# Patient Record
Sex: MALE | Race: WHITE | Employment: FULL TIME | URBAN - NONMETROPOLITAN AREA
[De-identification: names, ages, dates, MRNs, and addresses within clinical notes are randomized per-mention and may not be internally consistent; named-entity substitution may affect disease eponyms.]

---

## 2021-04-15 ENCOUNTER — OFFICE VISIT (OUTPATIENT)
Dept: PRIMARY CARE CLINIC | Age: 36
End: 2021-04-15
Payer: COMMERCIAL

## 2021-04-15 VITALS
TEMPERATURE: 99.9 F | RESPIRATION RATE: 16 BRPM | DIASTOLIC BLOOD PRESSURE: 100 MMHG | SYSTOLIC BLOOD PRESSURE: 168 MMHG | HEIGHT: 71 IN | BODY MASS INDEX: 36.18 KG/M2 | WEIGHT: 258.4 LBS | HEART RATE: 84 BPM

## 2021-04-15 DIAGNOSIS — I10 ESSENTIAL HYPERTENSION: ICD-10-CM

## 2021-04-15 DIAGNOSIS — J03.00 ACUTE NON-RECURRENT STREPTOCOCCAL TONSILLITIS: Primary | ICD-10-CM

## 2021-04-15 PROCEDURE — 99213 OFFICE O/P EST LOW 20 MIN: CPT | Performed by: FAMILY MEDICINE

## 2021-04-15 RX ORDER — CLINDAMYCIN HYDROCHLORIDE 300 MG/1
300 CAPSULE ORAL 3 TIMES DAILY
Qty: 30 CAPSULE | Refills: 0 | Status: SHIPPED | OUTPATIENT
Start: 2021-04-15 | End: 2021-04-25

## 2021-04-15 RX ORDER — CLOBETASOL PROPIONATE 0.5 MG/G
CREAM TOPICAL
COMMUNITY
Start: 2021-04-02

## 2021-04-15 RX ORDER — LOSARTAN POTASSIUM AND HYDROCHLOROTHIAZIDE 12.5; 1 MG/1; MG/1
1 TABLET ORAL DAILY
Qty: 30 TABLET | Refills: 0 | Status: SHIPPED | OUTPATIENT
Start: 2021-04-15 | End: 2021-05-14 | Stop reason: SDUPTHER

## 2021-04-15 ASSESSMENT — PATIENT HEALTH QUESTIONNAIRE - PHQ9
SUM OF ALL RESPONSES TO PHQ9 QUESTIONS 1 & 2: 0
2. FEELING DOWN, DEPRESSED OR HOPELESS: 0
SUM OF ALL RESPONSES TO PHQ QUESTIONS 1-9: 0
1. LITTLE INTEREST OR PLEASURE IN DOING THINGS: 0

## 2021-04-15 NOTE — PROGRESS NOTES
Patient is here with complaints of sore throat. He said that this began on Tuesday. He said after he came in from mowing his lawn he noticed it. He said he did run a fever yesterday of 100.4. Today in office his temperature was 99.9. He said he has taken allergy pills, iburprofen, and dayquil and nyquil which he said does give him a little relief. Patient did have a blood pressure in office today of 168/100. He said he will start a medication to lower it because this has been an ongoing issue. Allergies:  Ceclor [cefaclor]    Past Medical History:    Past Medical History:   Diagnosis Date    Hypertension        Past Surgical History:    Past Surgical History:   Procedure Laterality Date    KNEE ARTHROSCOPY Bilateral 2003; 2007   C/ [facility] [address]    TOE SURGERY Left 2000       Social History:   Social History     Tobacco Use    Smoking status: Never Smoker    Smokeless tobacco: Never Used   Substance Use Topics    Alcohol use: Not on file       Family History:   No family history on file. Review of Systems:  Constitutional: positive for fever or chills  Eyes: negative for visual disturbance   ENT: positive for sore throat ,no nasal congestion  Respiratory: negative for cough, shortness of breath and sputum  Cardiovascular: negative for chest pain or palpitations  Genitourinary: negative for dysuria,hematuria, urgency or frequency  Musculoskeletal:negative for arthralgias, muscle weakness and stiff joints   Integument/breast: negative for skin rash or lesions      Objective:  Physical Exam:  GEN:   A & O x3, no apparent distress  EYES: No gross abnormalities. ENT:neck without nodes and tonsils red, enlarged, with exudate present  NECK: normal, supple, no lymphadenopathy,  no carotid bruits  PULM: clear to auscultation bilaterally- no wheezes, rales or rhonchi, normal air movement, no respiratory distress  COR: regular rate & rhythm, no murmurs and no gallops  Skin: No rash    Assessment:  1. Acute non-recurrent streptococcal tonsillitis    2. Essential hypertension          Plan:  · Encouraged increased oral fluids  Orders Placed This Encounter   Procedures    CBC Auto Differential     Standing Status:   Future     Standing Expiration Date:   4/15/2022    Comprehensive Metabolic Panel     Standing Status:   Future     Standing Expiration Date:   4/15/2022    Lipid Panel     Standing Status:   Future     Standing Expiration Date:   4/15/2022     Order Specific Question:   Is Patient Fasting?/# of Hours     Answer:   yes     No follow-ups on file.    Orders Placed This Encounter   Medications    losartan-hydroCHLOROthiazide (HYZAAR) 100-12.5 MG per tablet     Sig: Take 1 tablet by mouth daily     Dispense:  30 tablet     Refill:  0    clindamycin (CLEOCIN) 300 MG capsule     Sig: Take 1 capsule by mouth 3 times daily for 10 days     Dispense:  30 capsule     Refill:  0     Medication directions and side effects discussed    Electronically signed by Chalo Robertson MD on 4/15/2021 at 10:23 AM

## 2021-05-12 LAB
ABSOLUTE BASO #: 0 X10E9/L (ref 0–0.2)
ABSOLUTE EOS #: 0.1 X10E9/L (ref 0–0.4)
ABSOLUTE LYMPH #: 1.7 X10E9/L (ref 1–3.5)
ABSOLUTE MONO #: 0.5 X10E9/L (ref 0–0.9)
ABSOLUTE NEUT #: 2.5 X10E9/L (ref 1.5–6.6)
ALBUMIN SERPL-MCNC: 4.3 G/DL (ref 3.2–5.3)
ALK PHOSPHATASE: 33 U/L (ref 39–130)
ALT SERPL-CCNC: 22 U/L (ref 0–40)
ANION GAP SERPL CALCULATED.3IONS-SCNC: 8 MMOL/L (ref 5–15)
AST SERPL-CCNC: 15 U/L (ref 0–41)
BASOPHILS RELATIVE PERCENT: 0.8 %
BILIRUB SERPL-MCNC: 0.5 MG/DL (ref 0.3–1.2)
BUN BLDV-MCNC: 17 MG/DL (ref 5–23)
CALCIUM SERPL-MCNC: 9.2 MG/DL (ref 8.5–10.5)
CHLORIDE BLD-SCNC: 102 MMOL/L (ref 98–109)
CHOLESTEROL/HDL RATIO: 4.5 (ref 1–5)
CHOLESTEROL: 234 MG/DL (ref 150–200)
CO2: 27 MMOL/L (ref 22–32)
CREAT SERPL-MCNC: 0.84 MG/DL (ref 0.6–1.3)
EGFR AFRICAN AMERICAN: >60 ML/MIN/1.73SQ.M
EGFR IF NONAFRICAN AMERICAN: >60 ML/MIN/1.73SQ.M
EOSINOPHILS RELATIVE PERCENT: 2 %
GLUCOSE: 96 MG/DL (ref 65–99)
HCT VFR BLD CALC: 43.6 % (ref 39–49)
HDLC SERPL-MCNC: 52 MG/DL
HEMOGLOBIN: 15.2 G/DL (ref 13–17)
LDL CHOLESTEROL CALCULATED: 156 MG/DL
LDL/HDL RATIO: 3
LYMPHOCYTE %: 35.2 %
MCH RBC QN AUTO: 29.6 PG (ref 27–34)
MCHC RBC AUTO-ENTMCNC: 35 G/DL (ref 32–36)
MCV RBC AUTO: 85 FL (ref 80–100)
MONOCYTES # BLD: 10.8 %
NEUTROPHILS RELATIVE PERCENT: 51.2 %
PDW BLD-RTO: 13.9 % (ref 11.5–15)
PLATELETS: 244 X10E9/L (ref 150–450)
PMV BLD AUTO: 8.3 FL (ref 7–12)
POTASSIUM SERPL-SCNC: 4.1 MMOL/L (ref 3.5–5)
RBC: 5.15 X10E12/L (ref 4.1–5.7)
SODIUM BLD-SCNC: 137 MMOL/L (ref 134–146)
TOTAL PROTEIN: 7.3 G/DL (ref 6–8)
TRIGL SERPL-MCNC: 130 MG/DL (ref 27–150)
VLDLC SERPL CALC-MCNC: 26 MG/DL (ref 0–30)
WBC: 4.9 X10E9/L (ref 4–11)

## 2021-05-14 ENCOUNTER — OFFICE VISIT (OUTPATIENT)
Dept: PRIMARY CARE CLINIC | Age: 36
End: 2021-05-14
Payer: COMMERCIAL

## 2021-05-14 VITALS
HEART RATE: 72 BPM | DIASTOLIC BLOOD PRESSURE: 88 MMHG | WEIGHT: 251.6 LBS | BODY MASS INDEX: 35.09 KG/M2 | RESPIRATION RATE: 16 BRPM | SYSTOLIC BLOOD PRESSURE: 136 MMHG

## 2021-05-14 DIAGNOSIS — E78.00 HYPERCHOLESTEROLEMIA: ICD-10-CM

## 2021-05-14 DIAGNOSIS — I10 ESSENTIAL HYPERTENSION: Primary | ICD-10-CM

## 2021-05-14 PROCEDURE — 99213 OFFICE O/P EST LOW 20 MIN: CPT | Performed by: FAMILY MEDICINE

## 2021-05-14 RX ORDER — LOSARTAN POTASSIUM AND HYDROCHLOROTHIAZIDE 12.5; 1 MG/1; MG/1
1 TABLET ORAL DAILY
Qty: 90 TABLET | Refills: 0 | Status: SHIPPED | OUTPATIENT
Start: 2021-05-14 | End: 2021-08-20 | Stop reason: SDUPTHER

## 2021-05-14 RX ORDER — ATORVASTATIN CALCIUM 20 MG/1
20 TABLET, FILM COATED ORAL DAILY
Qty: 30 TABLET | Refills: 3 | Status: SHIPPED | OUTPATIENT
Start: 2021-05-14 | End: 2021-08-20 | Stop reason: SDUPTHER

## 2021-05-14 SDOH — ECONOMIC STABILITY: FOOD INSECURITY: WITHIN THE PAST 12 MONTHS, YOU WORRIED THAT YOUR FOOD WOULD RUN OUT BEFORE YOU GOT MONEY TO BUY MORE.: NEVER TRUE

## 2021-05-14 SDOH — ECONOMIC STABILITY: INCOME INSECURITY: HOW HARD IS IT FOR YOU TO PAY FOR THE VERY BASICS LIKE FOOD, HOUSING, MEDICAL CARE, AND HEATING?: NOT HARD AT ALL

## 2021-05-14 NOTE — PROGRESS NOTES
Hypertension: Patient here for follow-up of elevated blood pressure. He is not exercising and is adherent to low salt diet. Blood pressure is not well controlled at home. Patient does not take blood pressure at home. Cardiac symptoms none. Patient denies chest pain, fatigue and palpitations. Cardiovascular risk factors: hypertension, male gender and obesity (BMI >= 30 kg/m2). Use of agents associated with hypertension: none. Patient began Losartan at his last visit, he has had no side effects with the medication. In office his blood pressure was 138/92. Patient is also here to receive lab results. Allergies:  Ceclor [cefaclor]    Past Medical History:    Past Medical History:   Diagnosis Date    Hypertension        Past Surgical History:    Past Surgical History:   Procedure Laterality Date    [facility]    TOE SURGERY Left 2000       Social History:   Social History     Tobacco Use    Smoking status: Never Smoker    Smokeless tobacco: Never Used   Substance Use Topics    Alcohol use: Not on file       Family History:   No family history on file. Review of Systems:  Constitutional: negative for fevers or chills  Eyes: negative for visual disturbance   ENT: negative for sore throat or nasal congestion  Respiratory: no cough or shortness of breath  Cardiovascular: negative for chest pain or palpitations  Gastrointestinal: negative for abd pain, nausea, vomiting, diarrhea or constipation  Genitourinary: negative for dysuria, urgency or frequency  Integument/breast: negative for skin rash or lesions  Neurological: negative for unilateral weakness, numbness or tingling. Skeletal Muscular: no joint pain     Medication List          Accurate as of May 14, 2021  9:30 AM. If you have any questions, ask your nurse or doctor.             START taking these medications    atorvastatin 20 MG tablet  Commonly known as: Lipitor  Take 1 tablet by mouth daily  Started by: Aissatou Dean MD        CONTINUE taking these medications    clobetasol 0.05 % cream  Commonly known as: TEMOVATE     losartan-hydroCHLOROthiazide 100-12.5 MG per tablet  Commonly known as: HYZAAR  Take 1 tablet by mouth daily           Where to Get Your Medications      These medications were sent to [address] [place] [address], [address] [place] [address] RAJESH Gallardo    Phone: [phone]   · atorvastatin 20 MG tablet  · losartan-hydroCHLOROthiazide 100-12.5 MG per tablet         Objective:  Physical Exam:  VitalsPulse 72   Resp 16   Wt 251 lb 9.6 oz (114.1 kg)   BMI 35.09 kg/m²   GEN:   A & O x3, no apparent distress  EYES: No gross abnormalities. ENT:ENT exam normal, no neck nodes or sinus tenderness  NECK: normal, supple, no lymphadenopathy,  no carotid bruits  PULM: clear to auscultation bilaterally- no wheezes, rales or rhonchi, normal air movement, no respiratory distress  COR: regular rate & rhythm, no murmurs and no gallops  ABD:  soft, non-tender, non-distended, normal bowel sounds, no masses or organomegaly  : deferred  EXT: normal strength, tone, and muscle mass  NEURO: Motor and sensory grossly intact  SKIN:  No skin lesions or rashes      Labs:  Lab Results   Component Value Date    BUN 17 05/12/2021    CREATININE 0.84 05/12/2021     05/12/2021    K 4.1 05/12/2021    CALCIUM 9.2 05/12/2021     05/12/2021    CO2 27 05/12/2021    CHOL 234 (H) 05/12/2021    HDL 52 05/12/2021    TRIG 130 05/12/2021    ALT 22 05/12/2021    AST 15 05/12/2021       Assessment:  1. Essential hypertension    2. Hypercholesterolemia      Patient Active Problem List   Diagnosis Code    Essential hypertension I10     Labs reviewed : cbc,cmp,lipid  Other tests reviewed:   Plan:   Diagnosis Orders   1. Essential hypertension     2.  Hypercholesterolemia         · Continue current medications  · Add lipitor  · Encouraged low sodium, low cholesterol, weight loss diet. · Goal for blood pressure controll is 120/80  · Recommended regular exercise as tolerated, 3-5 times per day  · Labs: fasting lipids, ALT, AST and BMP in 1 months  · Follow up in 3 months  ·     No orders of the defined types were placed in this encounter. Current Outpatient Medications   Medication Sig Dispense Refill    losartan-hydroCHLOROthiazide (HYZAAR) 100-12.5 MG per tablet Take 1 tablet by mouth daily 90 tablet 0    atorvastatin (LIPITOR) 20 MG tablet Take 1 tablet by mouth daily 30 tablet 3    clobetasol (TEMOVATE) 0.05 % cream APPLY A THIN LAYER BY TOPICAL ROUTE TWICE DAILY TO THE AFFECTED A...  (REFER TO PRESCRIPTION NOTES). No current facility-administered medications for this visit. No follow-ups on file.       Electronically signed by Tasha Vasquez MD on 5/14/2021 at 9:30 AM

## 2021-06-12 LAB
ALT SERPL-CCNC: 20 U/L (ref 0–40)
AST SERPL-CCNC: 16 U/L (ref 0–41)
CHOLESTEROL/HDL RATIO: 3.3 (ref 1–5)
CHOLESTEROL: 170 MG/DL (ref 150–200)
HDLC SERPL-MCNC: 51 MG/DL
LDL CHOLESTEROL CALCULATED: 101 MG/DL
LDL/HDL RATIO: 2
TRIGL SERPL-MCNC: 89 MG/DL (ref 27–150)
VLDLC SERPL CALC-MCNC: 18 MG/DL (ref 0–30)

## 2021-06-18 ENCOUNTER — TELEPHONE (OUTPATIENT)
Dept: PRIMARY CARE CLINIC | Age: 36
End: 2021-06-18

## 2021-06-18 DIAGNOSIS — L23.7 POISON IVY: Primary | ICD-10-CM

## 2021-06-18 RX ORDER — METHYLPREDNISOLONE 4 MG/1
TABLET ORAL
Qty: 1 KIT | Refills: 0 | Status: SHIPPED | OUTPATIENT
Start: 2021-06-18 | End: 2021-06-24

## 2021-06-18 NOTE — TELEPHONE ENCOUNTER
Patient called this morning saying he was exposed to poison ivy and was hoping you could call him something in to the pharmacy.

## 2021-08-20 ENCOUNTER — OFFICE VISIT (OUTPATIENT)
Dept: PRIMARY CARE CLINIC | Age: 36
End: 2021-08-20
Payer: COMMERCIAL

## 2021-08-20 VITALS — BODY MASS INDEX: 33.89 KG/M2 | WEIGHT: 243 LBS | HEART RATE: 67 BPM

## 2021-08-20 DIAGNOSIS — E78.00 HYPERCHOLESTEROLEMIA: Primary | ICD-10-CM

## 2021-08-20 DIAGNOSIS — I10 ESSENTIAL HYPERTENSION: ICD-10-CM

## 2021-08-20 PROCEDURE — 99213 OFFICE O/P EST LOW 20 MIN: CPT | Performed by: FAMILY MEDICINE

## 2021-08-20 RX ORDER — ASPIRIN 81 MG/1
81 TABLET ORAL DAILY
COMMUNITY

## 2021-08-20 RX ORDER — ATORVASTATIN CALCIUM 20 MG/1
20 TABLET, FILM COATED ORAL DAILY
Qty: 30 TABLET | Refills: 0 | Status: SHIPPED | OUTPATIENT
Start: 2021-08-20 | End: 2021-11-18 | Stop reason: SDUPTHER

## 2021-08-20 RX ORDER — LOSARTAN POTASSIUM AND HYDROCHLOROTHIAZIDE 12.5; 1 MG/1; MG/1
1 TABLET ORAL DAILY
Qty: 90 TABLET | Refills: 0 | Status: SHIPPED | OUTPATIENT
Start: 2021-08-20

## 2021-08-20 ASSESSMENT — PATIENT HEALTH QUESTIONNAIRE - PHQ9
SUM OF ALL RESPONSES TO PHQ QUESTIONS 1-9: 0
1. LITTLE INTEREST OR PLEASURE IN DOING THINGS: 0
SUM OF ALL RESPONSES TO PHQ QUESTIONS 1-9: 0
SUM OF ALL RESPONSES TO PHQ QUESTIONS 1-9: 0
SUM OF ALL RESPONSES TO PHQ9 QUESTIONS 1 & 2: 0
2. FEELING DOWN, DEPRESSED OR HOPELESS: 0

## 2021-08-20 NOTE — PROGRESS NOTES
Hypertension: Patient here for follow-up of elevated blood pressure. He is exercising and is adherent to low salt diet. Blood pressure is not well controlled at home. Cardiac symptoms none. Patient denies chest pain, fatigue and palpitations. Cardiovascular risk factors: hypertension, male gender and obesity (BMI >= 30 kg/m2). Use of agents associated with hypertension: none. Hyperlipidemia: Patient presents with hyperlipidemia. There is a family history of hyperlipidemia. RECHECK 138/95      Allergies:  Ceclor [cefaclor]    Past Medical History:    Past Medical History:   Diagnosis Date    Hypertension        Past Surgical History:    Past Surgical History:   Procedure Laterality Date    [facility]    TOE SURGERY Left 2000       Social History:   Social History     Tobacco Use    Smoking status: Never Smoker    Smokeless tobacco: Never Used   Substance Use Topics    Alcohol use: Not on file       Family History:   No family history on file. Review of Systems:  Constitutional: negative for fevers or chills  Eyes: negative for visual disturbance   ENT: negative for sore throat or nasal congestion  Respiratory: no cough or shortness of breath  Cardiovascular: negative for chest pain or palpitations  Gastrointestinal: negative for abd pain, nausea, vomiting, diarrhea or constipation  Genitourinary: negative for dysuria, urgency or frequency  Integument/breast: negative for skin rash or lesions  Neurological: negative for unilateral weakness, numbness or tingling. Skeletal Muscular: no joint pain     Medication List          Accurate as of August 20, 2021  9:19 AM. If you have any questions, ask your nurse or doctor.             CONTINUE taking these medications    aspirin 81 MG EC tablet     atorvastatin 20 MG tablet  Commonly known as: Lipitor  Take 1 tablet by mouth daily     clobetasol 0.05 % cream  Commonly known as: TEMOVATE     losartan-hydroCHLOROthiazide 100-12.5 MG per tablet  Commonly known as: HYZAAR  Take 1 tablet by mouth daily            Objective:  Physical Exam:  VitalsPulse 67   Wt 243 lb (110.2 kg)   BMI 33.89 kg/m²   GEN:   A & O x3, no apparent distress  EYES: No gross abnormalities. ENT:ENT exam normal, no neck nodes or sinus tenderness  NECK: normal, supple, no lymphadenopathy,  no carotid bruits  PULM: clear to auscultation bilaterally- no wheezes, rales or rhonchi, normal air movement, no respiratory distress  COR: regular rate & rhythm, no murmurs and no gallops  ABD:  soft, non-tender  : deferred  EXT: normal strength, tone, and muscle mass  NEURO: Motor and sensory grossly intact  SKIN:  No skin lesions or rashes      Labs:  Lab Results   Component Value Date    BUN 17 05/12/2021    CREATININE 0.84 05/12/2021     05/12/2021    K 4.1 05/12/2021    CALCIUM 9.2 05/12/2021     05/12/2021    CO2 27 05/12/2021    CHOL 170 06/11/2021    HDL 51 06/11/2021    TRIG 89 06/11/2021    ALT 20 06/11/2021    AST 16 06/11/2021       Assessment:  1. Hypercholesterolemia    2. Essential hypertension      Patient Active Problem List   Diagnosis Code    Essential hypertension I10     Labs reviewed : Other tests reviewed:   Plan:   Diagnosis Orders   1. Hypercholesterolemia     2. Essential hypertension         · Continue current medications  · Encouraged low sodium, low cholesterol, weight loss diet. · Goal for blood pressure controll is 130/80  · Recommended regular exercise as tolerated, 3-5 times per day  · Follow up in 3 months  ·     No orders of the defined types were placed in this encounter.       Current Outpatient Medications   Medication Sig Dispense Refill    aspirin 81 MG EC tablet Take 81 mg by mouth daily      losartan-hydroCHLOROthiazide (HYZAAR) 100-12.5 MG per tablet Take 1 tablet by mouth daily 90 tablet 0    atorvastatin (LIPITOR) 20 MG tablet Take 1 tablet by mouth daily 30 tablet 3  clobetasol (TEMOVATE) 0.05 % cream APPLY A THIN LAYER BY TOPICAL ROUTE TWICE DAILY TO THE AFFECTED A...  (REFER TO PRESCRIPTION NOTES). (Patient not taking: Reported on 8/20/2021)       No current facility-administered medications for this visit. No follow-ups on file.       Electronically signed by Cy Singh MD on 8/20/2021 at 9:19 AM

## 2021-08-20 NOTE — PATIENT INSTRUCTIONS
SURVEY:    You may be receiving a survey from [employer] regarding your visit today. You may get this in the mail, through your MyChart, or in your email. Please complete the survey to enable us to provide the highest quality of care to you and your family. If you cannot score us a very good (5 Stars) on any question, please call the office to discuss how we could of made your experience exceptional.    Thank you!     ELAINE Lopez, BISI Reeves, [address] [place] [address], [address] [facility] [address]    Phone: [phone]  Fax: [phone]    Office Hours:   Richardson Henry, F: 8-5 Wednesday: 9-11

## 2021-11-18 RX ORDER — ATORVASTATIN CALCIUM 20 MG/1
20 TABLET, FILM COATED ORAL DAILY
Qty: 30 TABLET | Refills: 0 | Status: SHIPPED | OUTPATIENT
Start: 2021-11-18

## 2023-09-11 ENCOUNTER — HOSPITAL ENCOUNTER (EMERGENCY)
Age: 38
Discharge: HOME | End: 2023-09-11
Attending: EMERGENCY MEDICINE
Payer: COMMERCIAL

## 2023-09-11 VITALS
HEART RATE: 73 BPM | SYSTOLIC BLOOD PRESSURE: 169 MMHG | DIASTOLIC BLOOD PRESSURE: 119 MMHG | OXYGEN SATURATION: 98 % | TEMPERATURE: 97.9 F | RESPIRATION RATE: 15 BRPM

## 2023-09-11 DIAGNOSIS — F43.9 STRESS: ICD-10-CM

## 2023-09-11 DIAGNOSIS — I10 HYPERTENSION, UNSPECIFIED TYPE: Primary | ICD-10-CM

## 2023-09-11 PROCEDURE — 99283 EMERGENCY DEPT VISIT LOW MDM: CPT

## 2023-09-11 RX ORDER — LOSARTAN POTASSIUM AND HYDROCHLOROTHIAZIDE 12.5; 1 MG/1; MG/1
1 TABLET ORAL DAILY
Qty: 30 TABLET | Refills: 1 | Status: SHIPPED | OUTPATIENT
Start: 2023-09-11

## 2023-09-11 ASSESSMENT — PAIN - FUNCTIONAL ASSESSMENT: PAIN_FUNCTIONAL_ASSESSMENT: NONE - DENIES PAIN

## 2023-09-11 NOTE — ED PROVIDER NOTES
[address] [facility] ED  EMERGENCY DEPARTMENT ENCOUNTER      Pt Name: Jason Aldridge  MRN: [id number]  [address] [facility], [place] 1985  Date of evaluation: 9/11/2023  Provider: Yogesh Rodriguez MD    CHIEF COMPLAINT       Chief Complaint   Patient presents with    OTHER     Patient sent over from dentist for high BP. HISTORY OF PRESENT ILLNESS   (Location/Symptom, Timing/Onset, Context/Setting, Quality, Duration, Modifying Factors, Severity)  Note limiting factors. Jason Aldridge is a 41 y.o. male who presents to the emergency department      67-year-old male presents emergency department for evaluation of elevated blood pressure. He had been in his dentist office today and after measuring his blood pressure was instructed to come to the emergency department. Patient's blood pressure on arrival was 75 over 106 mmHg. Patient denies any headache chest pain shortness of breath back pain abdominal pain or any other acute concerns. He does reports he is under significant amount of stress that has been ongoing for quite some time and is associated with work in his father's illness as well as general stress at home. Josh Vincent He had also been on the Hyzaar which she discontinued about a year ago. He has no acute concerns at today's visit. Nursing Notes were reviewed. REVIEW OF SYSTEMS    (2-9 systems for level 4, 10 or more for level 5)     Review of Systems   All other systems reviewed and are negative. Except as noted above the remainder of the review of systems was reviewed and negative.        PAST MEDICAL HISTORY     Past Medical History:   Diagnosis Date    Hypertension          SURGICAL HISTORY       Past Surgical History:   Procedure Laterality Date    KNEE ARTHROSCOPY Bilateral 2003; 2007    [facility]    TOE SURGERY Left 2000         CURRENT MEDICATIONS       Current Discharge Medication List        CONTINUE these medications which have NOT CHANGED    Details   atorvastatin (LIPITOR) 20 MG tablet

## 2023-09-11 NOTE — DISCHARGE INSTRUCTIONS
Continue current medications as prescribed. Start taking your Hyzaar daily again. Follow-up with primary care provider as soon as possible for reevaluation.   Must seek medical attention immediately should you develop any headaches chest pain difficulty breathing back pain abdominal pain or any other acute concerns

## 2023-10-06 ENCOUNTER — OFFICE VISIT (OUTPATIENT)
Dept: PRIMARY CARE CLINIC | Age: 38
End: 2023-10-06
Payer: COMMERCIAL

## 2023-10-06 ENCOUNTER — HOSPITAL ENCOUNTER (OUTPATIENT)
Age: 38
Discharge: HOME | End: 2023-10-06
Payer: COMMERCIAL

## 2023-10-06 VITALS
SYSTOLIC BLOOD PRESSURE: 145 MMHG | HEIGHT: 71 IN | HEART RATE: 58 BPM | RESPIRATION RATE: 18 BRPM | OXYGEN SATURATION: 98 % | WEIGHT: 254 LBS | DIASTOLIC BLOOD PRESSURE: 90 MMHG | BODY MASS INDEX: 35.56 KG/M2

## 2023-10-06 DIAGNOSIS — Z11.4 ENCOUNTER FOR SCREENING FOR HIV: ICD-10-CM

## 2023-10-06 DIAGNOSIS — Z11.59 ENCOUNTER FOR HEPATITIS C SCREENING TEST FOR LOW RISK PATIENT: ICD-10-CM

## 2023-10-06 DIAGNOSIS — E78.5 HYPERLIPIDEMIA, UNSPECIFIED HYPERLIPIDEMIA TYPE: ICD-10-CM

## 2023-10-06 DIAGNOSIS — I10 ESSENTIAL HYPERTENSION: Primary | ICD-10-CM

## 2023-10-06 DIAGNOSIS — I10 ESSENTIAL HYPERTENSION: ICD-10-CM

## 2023-10-06 DIAGNOSIS — J06.9 VIRAL URI: ICD-10-CM

## 2023-10-06 LAB
ALBUMIN SERPL-MCNC: 4.7 G/DL (ref 3.5–5.2)
ALBUMIN/GLOB SERPL: 1.6 {RATIO} (ref 1–2.5)
ALP SERPL-CCNC: 60 U/L (ref 40–129)
ALT SERPL-CCNC: 180 U/L (ref 5–41)
ANION GAP SERPL CALCULATED.3IONS-SCNC: 10 MMOL/L (ref 9–17)
AST SERPL-CCNC: 72 U/L
BASOPHILS # BLD: 0.05 K/UL (ref 0–0.2)
BASOPHILS NFR BLD: 1 % (ref 0–2)
BILIRUB SERPL-MCNC: 0.7 MG/DL (ref 0.3–1.2)
BUN SERPL-MCNC: 15 MG/DL (ref 6–20)
BUN/CREAT SERPL: 19 (ref 9–20)
CALCIUM SERPL-MCNC: 9.9 MG/DL (ref 8.6–10.4)
CHLORIDE SERPL-SCNC: 100 MMOL/L (ref 98–107)
CHOLEST SERPL-MCNC: 212 MG/DL
CHOLESTEROL/HDL RATIO: 4.8
CO2 SERPL-SCNC: 28 MMOL/L (ref 20–31)
CREAT SERPL-MCNC: 0.8 MG/DL (ref 0.7–1.2)
EOSINOPHIL # BLD: 0.15 K/UL (ref 0–0.44)
EOSINOPHILS RELATIVE PERCENT: 2 % (ref 1–4)
ERYTHROCYTE [DISTWIDTH] IN BLOOD BY AUTOMATED COUNT: 13.1 % (ref 11.8–14.4)
GFR SERPL CREATININE-BSD FRML MDRD: >60 ML/MIN/1.73M2
GLUCOSE SERPL-MCNC: 102 MG/DL (ref 70–99)
HCT VFR BLD AUTO: 47.9 % (ref 40.7–50.3)
HCV AB SERPL QL IA: NONREACTIVE
HDLC SERPL-MCNC: 44 MG/DL
HGB BLD-MCNC: 16.2 G/DL (ref 13–17)
HIV 1+2 AB+HIV1 P24 AG SERPL QL IA: NONREACTIVE
IMM GRANULOCYTES # BLD AUTO: <0.03 K/UL (ref 0–0.3)
IMM GRANULOCYTES NFR BLD: 0 %
LDLC SERPL CALC-MCNC: 127 MG/DL (ref 0–130)
LYMPHOCYTES NFR BLD: 1.5 K/UL (ref 1.1–3.7)
LYMPHOCYTES RELATIVE PERCENT: 24 % (ref 24–43)
MCH RBC QN AUTO: 29.5 PG (ref 25.2–33.5)
MCHC RBC AUTO-ENTMCNC: 33.8 G/DL (ref 28.4–34.8)
MCV RBC AUTO: 87.2 FL (ref 82.6–102.9)
MONOCYTES NFR BLD: 0.73 K/UL (ref 0.1–1.2)
MONOCYTES NFR BLD: 12 % (ref 3–12)
NEUTROPHILS NFR BLD: 61 % (ref 36–65)
NEUTS SEG NFR BLD: 3.74 K/UL (ref 1.5–8.1)
NRBC BLD-RTO: 0 PER 100 WBC
PLATELET # BLD AUTO: 255 K/UL (ref 138–453)
PMV BLD AUTO: 9.6 FL (ref 8.1–13.5)
POTASSIUM SERPL-SCNC: 4.2 MMOL/L (ref 3.7–5.3)
PROT SERPL-MCNC: 7.7 G/DL (ref 6.4–8.3)
RBC # BLD AUTO: 5.49 M/UL (ref 4.21–5.77)
SODIUM SERPL-SCNC: 138 MMOL/L (ref 135–144)
TRIGL SERPL-MCNC: 206 MG/DL
TSH SERPL DL<=0.05 MIU/L-ACNC: 2.32 UIU/ML (ref 0.3–5)
WBC OTHER # BLD: 6.2 K/UL (ref 3.5–11.3)

## 2023-10-06 PROCEDURE — 85025 COMPLETE CBC W/AUTO DIFF WBC: CPT

## 2023-10-06 PROCEDURE — 87389 HIV-1 AG W/HIV-1&-2 AB AG IA: CPT

## 2023-10-06 PROCEDURE — 36415 COLL VENOUS BLD VENIPUNCTURE: CPT

## 2023-10-06 PROCEDURE — 3077F SYST BP >= 140 MM HG: CPT | Performed by: FAMILY MEDICINE

## 2023-10-06 PROCEDURE — 84443 ASSAY THYROID STIM HORMONE: CPT

## 2023-10-06 PROCEDURE — 86803 HEPATITIS C AB TEST: CPT

## 2023-10-06 PROCEDURE — 99214 OFFICE O/P EST MOD 30 MIN: CPT | Performed by: FAMILY MEDICINE

## 2023-10-06 PROCEDURE — 80053 COMPREHEN METABOLIC PANEL: CPT

## 2023-10-06 PROCEDURE — 3080F DIAST BP >= 90 MM HG: CPT | Performed by: FAMILY MEDICINE

## 2023-10-06 PROCEDURE — 80061 LIPID PANEL: CPT

## 2023-10-06 RX ORDER — FLUTICASONE PROPIONATE 50 MCG
2 SPRAY, SUSPENSION (ML) NASAL DAILY
Qty: 48 G | Refills: 1 | Status: SHIPPED | OUTPATIENT
Start: 2023-10-06

## 2023-10-06 RX ORDER — AMLODIPINE BESYLATE 5 MG/1
5 TABLET ORAL DAILY
Qty: 90 TABLET | Refills: 0 | Status: SHIPPED | OUTPATIENT
Start: 2023-10-06 | End: 2024-01-04

## 2023-10-06 RX ORDER — GUAIFENESIN AND DEXTROMETHORPHAN HYDROBROMIDE 1200; 60 MG/1; MG/1
1 TABLET, EXTENDED RELEASE ORAL 2 TIMES DAILY PRN
Qty: 28 TABLET | COMMUNITY
Start: 2023-10-06

## 2023-10-06 NOTE — PATIENT INSTRUCTIONS
SURVEY:    You may be receiving a survey from [employer] regarding your visit today. Please complete the survey to enable us to provide the highest quality of care to you and your family. If you cannot score us a very good on any question, please call the office to discuss how we could of made your experience a very good one. Thank you.       Dr. Bianca CARNEY-CNP  Check-In Medical Assistant: Melvi Wylie Assistant: Adrianna Hooper rooming Medical Assistant/s: Bryant Appiah rooming Medical Assistant: Baron Martha Sierra Assistant: [id number] Assistant: Chepe West  Triage Medical Assistant: Cruzito Cain

## 2023-10-06 NOTE — PROGRESS NOTES
[facility]      Patient:  Wilton Cote 41 y.o. male     Date of Service: 10/6/23      Chief Complaint:   Chief Complaint   Patient presents with    New Patient     High Blood pressure concern, not feeling well, at home running 160/100  4weeks on bp meds not helping          History of Present Illness     Concerns of chronic hypertension, upper respiratory tract infection. Patient with chronic history of hypertension, does check home blood pressure and average readings are approximately 206-836 systolic. Asymptomatic at those times and also now with no chest pain, shortness of breath, lightheadedness or dizziness. Was recently evaluated in the emergency department after being sent by dentist office patient was restarted on medication losartan/hydrochlorothiazide at that time. Tolerating it well without side effects or intolerances. Also notes symptomatic onset of cough, congestion approximately 2 to 3 days prior. No known sick or ill contacts in the home. OTC medications for symptomatic relief. No fevers noted. Baseline p.o. and activity levels. Symptoms have improved since onset.         Allergies:    Ceclor [cefaclor]    Medication List:    Current Outpatient Medications   Medication Sig Dispense Refill    amLODIPine (NORVASC) 5 MG tablet Take 1 tablet by mouth daily 90 tablet 0    Dextromethorphan-guaiFENesin (MUCINEX DM MAXIMUM STRENGTH)  MG TB12 Take 1 tablet by mouth 2 times daily as needed (cough, congestion) 28 tablet     fluticasone (FLONASE) 50 MCG/ACT nasal spray 2 sprays by Each Nostril route daily 48 g 1    losartan-hydroCHLOROthiazide (HYZAAR) 100-12.5 MG per tablet Take 1 tablet by mouth daily 30 tablet 1    atorvastatin (LIPITOR) 20 MG tablet Take 1 tablet by mouth daily (Patient not taking: Reported on 10/6/2023) 30 tablet 0    aspirin 81 MG EC tablet Take 1 tablet by mouth daily (Patient not taking: Reported on 10/6/2023)      clobetasol (TEMOVATE) 0.05

## 2023-11-06 ENCOUNTER — OFFICE VISIT (OUTPATIENT)
Dept: PRIMARY CARE CLINIC | Age: 38
End: 2023-11-06
Payer: COMMERCIAL

## 2023-11-06 VITALS
HEIGHT: 71 IN | SYSTOLIC BLOOD PRESSURE: 132 MMHG | DIASTOLIC BLOOD PRESSURE: 86 MMHG | BODY MASS INDEX: 34.75 KG/M2 | WEIGHT: 248.2 LBS | OXYGEN SATURATION: 100 % | HEART RATE: 68 BPM

## 2023-11-06 DIAGNOSIS — R79.89 ELEVATED LFTS: ICD-10-CM

## 2023-11-06 DIAGNOSIS — I10 ESSENTIAL HYPERTENSION: ICD-10-CM

## 2023-11-06 DIAGNOSIS — E78.5 HYPERLIPIDEMIA, UNSPECIFIED HYPERLIPIDEMIA TYPE: Primary | ICD-10-CM

## 2023-11-06 PROCEDURE — 3075F SYST BP GE 130 - 139MM HG: CPT | Performed by: FAMILY MEDICINE

## 2023-11-06 PROCEDURE — 3079F DIAST BP 80-89 MM HG: CPT | Performed by: FAMILY MEDICINE

## 2023-11-06 PROCEDURE — 99214 OFFICE O/P EST MOD 30 MIN: CPT | Performed by: FAMILY MEDICINE

## 2023-11-06 RX ORDER — AMLODIPINE BESYLATE 10 MG/1
10 TABLET ORAL DAILY
Qty: 90 TABLET | Refills: 3 | Status: SHIPPED | OUTPATIENT
Start: 2023-11-06 | End: 2024-10-31

## 2023-11-06 RX ORDER — LOSARTAN POTASSIUM AND HYDROCHLOROTHIAZIDE 12.5; 1 MG/1; MG/1
1 TABLET ORAL DAILY
Qty: 90 TABLET | Refills: 3 | Status: SHIPPED | OUTPATIENT
Start: 2023-11-06 | End: 2024-10-31

## 2023-11-06 SDOH — ECONOMIC STABILITY: FOOD INSECURITY: WITHIN THE PAST 12 MONTHS, THE FOOD YOU BOUGHT JUST DIDN'T LAST AND YOU DIDN'T HAVE MONEY TO GET MORE.: NEVER TRUE

## 2023-11-06 SDOH — ECONOMIC STABILITY: INCOME INSECURITY: HOW HARD IS IT FOR YOU TO PAY FOR THE VERY BASICS LIKE FOOD, HOUSING, MEDICAL CARE, AND HEATING?: NOT HARD AT ALL

## 2023-11-06 SDOH — ECONOMIC STABILITY: FOOD INSECURITY: WITHIN THE PAST 12 MONTHS, YOU WORRIED THAT YOUR FOOD WOULD RUN OUT BEFORE YOU GOT MONEY TO BUY MORE.: NEVER TRUE

## 2023-11-06 SDOH — ECONOMIC STABILITY: HOUSING INSECURITY
IN THE LAST 12 MONTHS, WAS THERE A TIME WHEN YOU DID NOT HAVE A STEADY PLACE TO SLEEP OR SLEPT IN A SHELTER (INCLUDING NOW)?: NO

## 2023-11-06 ASSESSMENT — PATIENT HEALTH QUESTIONNAIRE - PHQ9
SUM OF ALL RESPONSES TO PHQ QUESTIONS 1-9: 0
SUM OF ALL RESPONSES TO PHQ9 QUESTIONS 1 & 2: 0
1. LITTLE INTEREST OR PLEASURE IN DOING THINGS: 0
2. FEELING DOWN, DEPRESSED OR HOPELESS: 0

## 2023-11-06 NOTE — PROGRESS NOTES
[facility]      Patient:  Bennie Ledbetter 41 y.o. male     Date of Service: 10/6/23      Chief Complaint:   Chief Complaint   Patient presents with    Follow-up         History of Present Illness     Concerns of chronic hypertension, hld, elevated lft    Patient with chronic history of hypertension, does check home blood pressure and average readings are approximately 077 systolic. Asymptomatic at those times and also now with no chest pain, shortness of breath, lightheadedness or dizziness. Maintained on losartan/adequate thiazide. Amlodipine was added last visit. Doing much better at this time, is physically active regularly and does cardiovascular activity, has modified diet to much healthier foods as well. No known history of hepatitis or other liver disease. Reviewed labs from 2 years ago and noted normal liver function. No known history of hemachromatosis. Allergies:    Ceclor [cefaclor]    Medication List:    Current Outpatient Medications   Medication Sig Dispense Refill    losartan-hydroCHLOROthiazide (HYZAAR) 100-12.5 MG per tablet Take 1 tablet by mouth daily 90 tablet 3    amLODIPine (NORVASC) 10 MG tablet Take 1 tablet by mouth daily 90 tablet 3     No current facility-administered medications for this visit. Review of Systems   Constitutional:  Negative for chills and fever. Respiratory:  Negative for cough, shortness of breath and wheezing. Cardiovascular:  Negative for chest pain. Gastrointestinal:  Negative for abdominal pain, diarrhea and vomiting. Musculoskeletal:  Negative for back pain and neck pain. Skin:  Negative for color change. Neurological:  Negative for dizziness, tremors, weakness and light-headedness. Physical Exam   Physical Exam  Constitutional:       Appearance: Well-developed. HENT:      Head: Normocephalic.       Right Ear: External ear normal.      Left Ear: External ear normal.   Cardiovascular:      Rate and

## 2023-11-06 NOTE — PATIENT INSTRUCTIONS
SURVEY:    You may be receiving a survey from [employer] regarding your visit today. Please complete the survey to enable us to provide the highest quality of care to you and your family. If you cannot score us a very good on any question, please call the office to discuss how we could of made your experience a very good one. Thank you.       Dr. Wes Grande APRN-CNP  Check-In Medical Assistant: Theresa Hurtado Assistant: Vladimir Horton rooming Medical Assistant/s: Nuria Helton rooming Medical Assistant: Jak Reina Assistant: [id number] Assistant: Maciej Aggarwal  Triage Medical Assistant: Cande Tucker

## 2024-02-15 ENCOUNTER — HOSPITAL ENCOUNTER (OUTPATIENT)
Age: 39
Discharge: HOME | End: 2024-02-15
Payer: COMMERCIAL

## 2024-02-15 DIAGNOSIS — E78.5 HYPERLIPIDEMIA, UNSPECIFIED HYPERLIPIDEMIA TYPE: ICD-10-CM

## 2024-02-15 DIAGNOSIS — R79.89 ELEVATED LFTS: ICD-10-CM

## 2024-02-15 DIAGNOSIS — I10 ESSENTIAL HYPERTENSION: ICD-10-CM

## 2024-02-15 LAB
ALBUMIN SERPL-MCNC: 4.3 G/DL (ref 3.5–5.2)
ALBUMIN/GLOB SERPL: 1.2 {RATIO} (ref 1–2.5)
ALP SERPL-CCNC: 56 U/L (ref 40–129)
ALT SERPL-CCNC: 23 U/L (ref 5–41)
ANION GAP SERPL CALCULATED.3IONS-SCNC: 13 MMOL/L (ref 9–17)
AST SERPL-CCNC: 18 U/L
BASOPHILS # BLD: 0.04 K/UL (ref 0–0.2)
BASOPHILS NFR BLD: 1 % (ref 0–2)
BILIRUB SERPL-MCNC: 0.4 MG/DL (ref 0.3–1.2)
BUN SERPL-MCNC: 18 MG/DL (ref 6–20)
BUN/CREAT SERPL: 23 (ref 9–20)
CALCIUM SERPL-MCNC: 9.7 MG/DL (ref 8.6–10.4)
CHLORIDE SERPL-SCNC: 101 MMOL/L (ref 98–107)
CHOLEST SERPL-MCNC: 221 MG/DL (ref 0–199)
CHOLESTEROL/HDL RATIO: 6
CO2 SERPL-SCNC: 26 MMOL/L (ref 20–31)
CREAT SERPL-MCNC: 0.8 MG/DL (ref 0.7–1.2)
EOSINOPHIL # BLD: 0.1 K/UL (ref 0–0.44)
EOSINOPHILS RELATIVE PERCENT: 1 % (ref 1–4)
ERYTHROCYTE [DISTWIDTH] IN BLOOD BY AUTOMATED COUNT: 12.7 % (ref 11.8–14.4)
EST. AVERAGE GLUCOSE BLD GHB EST-MCNC: 100 MG/DL
GFR SERPL CREATININE-BSD FRML MDRD: >60 ML/MIN/1.73M2
GLUCOSE SERPL-MCNC: 104 MG/DL (ref 70–99)
HBA1C MFR BLD: 5.1 % (ref 4–6)
HCT VFR BLD AUTO: 48.4 % (ref 40.7–50.3)
HDLC SERPL-MCNC: 39 MG/DL
HGB BLD-MCNC: 16.7 G/DL (ref 13–17)
IMM GRANULOCYTES # BLD AUTO: 0.04 K/UL (ref 0–0.3)
IMM GRANULOCYTES NFR BLD: 1 %
LDLC SERPL CALC-MCNC: 152 MG/DL (ref 0–100)
LYMPHOCYTES NFR BLD: 2.28 K/UL (ref 1.1–3.7)
LYMPHOCYTES RELATIVE PERCENT: 32 % (ref 24–43)
MCH RBC QN AUTO: 29.5 PG (ref 25.2–33.5)
MCHC RBC AUTO-ENTMCNC: 34.5 G/DL (ref 28.4–34.8)
MCV RBC AUTO: 85.5 FL (ref 82.6–102.9)
MONOCYTES NFR BLD: 0.52 K/UL (ref 0.1–1.2)
MONOCYTES NFR BLD: 7 % (ref 3–12)
NEUTROPHILS NFR BLD: 58 % (ref 36–65)
NEUTS SEG NFR BLD: 4.11 K/UL (ref 1.5–8.1)
NRBC BLD-RTO: 0 PER 100 WBC
PLATELET # BLD AUTO: 335 K/UL (ref 138–453)
PMV BLD AUTO: 9.2 FL (ref 8.1–13.5)
POTASSIUM SERPL-SCNC: 4.6 MMOL/L (ref 3.7–5.3)
PROT SERPL-MCNC: 7.9 G/DL (ref 6.4–8.3)
RBC # BLD AUTO: 5.66 M/UL (ref 4.21–5.77)
SODIUM SERPL-SCNC: 140 MMOL/L (ref 135–144)
TRIGL SERPL-MCNC: 153 MG/DL
VLDLC SERPL CALC-MCNC: 31 MG/DL
WBC OTHER # BLD: 7.1 K/UL (ref 3.5–11.3)

## 2024-02-15 PROCEDURE — 85025 COMPLETE CBC W/AUTO DIFF WBC: CPT

## 2024-02-15 PROCEDURE — 80053 COMPREHEN METABOLIC PANEL: CPT

## 2024-02-15 PROCEDURE — 80061 LIPID PANEL: CPT

## 2024-02-15 PROCEDURE — 36415 COLL VENOUS BLD VENIPUNCTURE: CPT

## 2024-02-15 PROCEDURE — 83036 HEMOGLOBIN GLYCOSYLATED A1C: CPT

## 2024-02-23 ENCOUNTER — OFFICE VISIT (OUTPATIENT)
Dept: PRIMARY CARE CLINIC | Age: 39
End: 2024-02-23

## 2024-02-23 VITALS
SYSTOLIC BLOOD PRESSURE: 126 MMHG | DIASTOLIC BLOOD PRESSURE: 92 MMHG | BODY MASS INDEX: 34.72 KG/M2 | HEIGHT: 71 IN | HEART RATE: 70 BPM | OXYGEN SATURATION: 98 % | WEIGHT: 248 LBS

## 2024-02-23 DIAGNOSIS — R79.89 ELEVATED LFTS: ICD-10-CM

## 2024-02-23 DIAGNOSIS — I10 ESSENTIAL HYPERTENSION: ICD-10-CM

## 2024-02-23 DIAGNOSIS — E78.5 HYPERLIPIDEMIA, UNSPECIFIED HYPERLIPIDEMIA TYPE: Primary | ICD-10-CM

## 2024-02-23 ASSESSMENT — PATIENT HEALTH QUESTIONNAIRE - PHQ9
SUM OF ALL RESPONSES TO PHQ QUESTIONS 1-9: 0
1. LITTLE INTEREST OR PLEASURE IN DOING THINGS: 0
SUM OF ALL RESPONSES TO PHQ QUESTIONS 1-9: 0
SUM OF ALL RESPONSES TO PHQ9 QUESTIONS 1 & 2: 0
2. FEELING DOWN, DEPRESSED OR HOPELESS: 0
SUM OF ALL RESPONSES TO PHQ QUESTIONS 1-9: 0
SUM OF ALL RESPONSES TO PHQ QUESTIONS 1-9: 0

## 2024-02-23 NOTE — PROGRESS NOTES
[facility]      Patient:  Mak Willingham 38 y.o. male     Date of Service: 10/6/23      Chief Complaint:   Chief Complaint   Patient presents with    Follow-up         History of Present Illness     Concerns of chronic hypertension, hld, elevated lft    Patient with chronic history of hypertension, does check home blood pressure and average readings are approximately 125-130 systolic.  Asymptomatic with no cp/sob.  Maintained on losartan/adequate thiazide.  Amlodipine. Doing much better at this time, is physically active regularly and does cardiovascular activity, has modified diet to much healthier foods as well.    No known history of hepatitis or other liver disease.  Reviewed labs from 2 years ago and noted normal liver function.  No known history of hemachromatosis. Allergies:    Ceclor [cefaclor]    Medication List:    Current Outpatient Medications   Medication Sig Dispense Refill    losartan-hydroCHLOROthiazide (HYZAAR) 100-12.5 MG per tablet Take 1 tablet by mouth daily 90 tablet 3    amLODIPine (NORVASC) 10 MG tablet Take 1 tablet by mouth daily 90 tablet 3     No current facility-administered medications for this visit.          Review of Systems   See hpi  Physical Exam   Physical Exam  Constitutional:       Appearance: Well-developed.   HENT:      Head: Normocephalic.      Right Ear: External ear normal.      Left Ear: External ear normal.   Cardiovascular:      Rate and Rhythm: Normal rate and regular rhythm.      Heart sounds: Normal heart sounds. No murmur heard.  Pulmonary:      Effort: Pulmonary effort is normal.      Breath sounds: Normal breath sounds. No wheezing.   Abdominal:      Palpations: Abdomen is soft.      Tenderness: There is no abdominal tenderness.   Musculoskeletal:         General: Normal range of motion.      Cervical back: Normal range of motion.   Skin:     General: Skin is warm.      Findings: No erythema.   Neurological:      Mental Status: Alert and

## 2025-02-26 SDOH — ECONOMIC STABILITY: FOOD INSECURITY: WITHIN THE PAST 12 MONTHS, YOU WORRIED THAT YOUR FOOD WOULD RUN OUT BEFORE YOU GOT MONEY TO BUY MORE.: NEVER TRUE

## 2025-02-26 SDOH — ECONOMIC STABILITY: FOOD INSECURITY: WITHIN THE PAST 12 MONTHS, THE FOOD YOU BOUGHT JUST DIDN'T LAST AND YOU DIDN'T HAVE MONEY TO GET MORE.: NEVER TRUE

## 2025-02-26 SDOH — ECONOMIC STABILITY: INCOME INSECURITY: IN THE LAST 12 MONTHS, WAS THERE A TIME WHEN YOU WERE NOT ABLE TO PAY THE MORTGAGE OR RENT ON TIME?: NO

## 2025-02-26 SDOH — ECONOMIC STABILITY: TRANSPORTATION INSECURITY
IN THE PAST 12 MONTHS, HAS THE LACK OF TRANSPORTATION KEPT YOU FROM MEDICAL APPOINTMENTS OR FROM GETTING MEDICATIONS?: NO

## 2025-02-26 SDOH — ECONOMIC STABILITY: TRANSPORTATION INSECURITY
IN THE PAST 12 MONTHS, HAS LACK OF TRANSPORTATION KEPT YOU FROM MEETINGS, WORK, OR FROM GETTING THINGS NEEDED FOR DAILY LIVING?: NO

## 2025-02-26 ASSESSMENT — PATIENT HEALTH QUESTIONNAIRE - PHQ9
1. LITTLE INTEREST OR PLEASURE IN DOING THINGS: NOT AT ALL
2. FEELING DOWN, DEPRESSED OR HOPELESS: NOT AT ALL
SUM OF ALL RESPONSES TO PHQ QUESTIONS 1-9: 0
2. FEELING DOWN, DEPRESSED OR HOPELESS: NOT AT ALL
SUM OF ALL RESPONSES TO PHQ9 QUESTIONS 1 & 2: 0
SUM OF ALL RESPONSES TO PHQ QUESTIONS 1-9: 0
SUM OF ALL RESPONSES TO PHQ9 QUESTIONS 1 & 2: 0
SUM OF ALL RESPONSES TO PHQ QUESTIONS 1-9: 0
SUM OF ALL RESPONSES TO PHQ QUESTIONS 1-9: 0
1. LITTLE INTEREST OR PLEASURE IN DOING THINGS: NOT AT ALL

## 2025-02-27 ENCOUNTER — OFFICE VISIT (OUTPATIENT)
Dept: PRIMARY CARE CLINIC | Age: 40
End: 2025-02-27
Payer: COMMERCIAL

## 2025-02-27 ENCOUNTER — HOSPITAL ENCOUNTER (OUTPATIENT)
Age: 40
Discharge: HOME | End: 2025-02-27
Payer: COMMERCIAL

## 2025-02-27 VITALS
DIASTOLIC BLOOD PRESSURE: 110 MMHG | OXYGEN SATURATION: 98 % | SYSTOLIC BLOOD PRESSURE: 150 MMHG | BODY MASS INDEX: 36.3 KG/M2 | HEIGHT: 72 IN | WEIGHT: 268 LBS | HEART RATE: 86 BPM

## 2025-02-27 DIAGNOSIS — R79.89 ELEVATED LFTS: ICD-10-CM

## 2025-02-27 DIAGNOSIS — I10 ESSENTIAL HYPERTENSION: Primary | ICD-10-CM

## 2025-02-27 DIAGNOSIS — E78.5 HYPERLIPIDEMIA, UNSPECIFIED HYPERLIPIDEMIA TYPE: ICD-10-CM

## 2025-02-27 DIAGNOSIS — I10 ESSENTIAL HYPERTENSION: ICD-10-CM

## 2025-02-27 LAB
ALBUMIN SERPL-MCNC: 4.4 G/DL (ref 3.5–5.2)
ALBUMIN/GLOB SERPL: 1.7 {RATIO} (ref 1–2.5)
ALP SERPL-CCNC: 45 U/L (ref 40–129)
ALT SERPL-CCNC: 30 U/L (ref 10–50)
ANION GAP SERPL CALCULATED.3IONS-SCNC: 9 MMOL/L (ref 9–16)
AST SERPL-CCNC: 20 U/L (ref 10–50)
BASOPHILS # BLD: 0.07 K/UL (ref 0–0.2)
BASOPHILS NFR BLD: 1 % (ref 0–2)
BILIRUB SERPL-MCNC: 0.5 MG/DL (ref 0–1.2)
BUN SERPL-MCNC: 13 MG/DL (ref 6–20)
BUN/CREAT SERPL: 14 (ref 9–20)
CALCIUM SERPL-MCNC: 9.6 MG/DL (ref 8.6–10.4)
CHLORIDE SERPL-SCNC: 100 MMOL/L (ref 98–107)
CHOLEST SERPL-MCNC: 230 MG/DL (ref 0–199)
CHOLESTEROL/HDL RATIO: 4.8
CO2 SERPL-SCNC: 28 MMOL/L (ref 20–31)
CREAT SERPL-MCNC: 0.9 MG/DL (ref 0.7–1.2)
EOSINOPHIL # BLD: 0.13 K/UL (ref 0–0.44)
EOSINOPHILS RELATIVE PERCENT: 2 % (ref 1–4)
ERYTHROCYTE [DISTWIDTH] IN BLOOD BY AUTOMATED COUNT: 12.8 % (ref 11.8–14.4)
EST. AVERAGE GLUCOSE BLD GHB EST-MCNC: 103 MG/DL
GFR, ESTIMATED: >90 ML/MIN/1.73M2
GLUCOSE SERPL-MCNC: 99 MG/DL (ref 74–99)
HBA1C MFR BLD: 5.2 % (ref 4–6)
HCT VFR BLD AUTO: 47.3 % (ref 40.7–50.3)
HDLC SERPL-MCNC: 48 MG/DL
HGB BLD-MCNC: 16 G/DL (ref 13–17)
IMM GRANULOCYTES # BLD AUTO: <0.03 K/UL (ref 0–0.3)
IMM GRANULOCYTES NFR BLD: 0 %
LDLC SERPL CALC-MCNC: 136 MG/DL (ref 0–100)
LYMPHOCYTES NFR BLD: 2.08 K/UL (ref 1.1–3.7)
LYMPHOCYTES RELATIVE PERCENT: 31 % (ref 24–43)
MCH RBC QN AUTO: 28.6 PG (ref 25.2–33.5)
MCHC RBC AUTO-ENTMCNC: 33.8 G/DL (ref 28.4–34.8)
MCV RBC AUTO: 84.6 FL (ref 82.6–102.9)
MONOCYTES NFR BLD: 0.66 K/UL (ref 0.1–1.2)
MONOCYTES NFR BLD: 10 % (ref 3–12)
NEUTROPHILS NFR BLD: 56 % (ref 36–65)
NEUTS SEG NFR BLD: 3.84 K/UL (ref 1.5–8.1)
NRBC BLD-RTO: 0 PER 100 WBC
PLATELET # BLD AUTO: 262 K/UL (ref 138–453)
PMV BLD AUTO: 9.4 FL (ref 8.1–13.5)
POTASSIUM SERPL-SCNC: 4.7 MMOL/L (ref 3.7–5.3)
PROT SERPL-MCNC: 7 G/DL (ref 6.6–8.7)
RBC # BLD AUTO: 5.59 M/UL (ref 4.21–5.77)
SODIUM SERPL-SCNC: 137 MMOL/L (ref 136–145)
TRIGL SERPL-MCNC: 232 MG/DL
TSH SERPL DL<=0.05 MIU/L-ACNC: 1.75 UIU/ML (ref 0.27–4.2)
VLDLC SERPL CALC-MCNC: 46 MG/DL (ref 1–30)
WBC OTHER # BLD: 6.8 K/UL (ref 3.5–11.3)

## 2025-02-27 PROCEDURE — 36415 COLL VENOUS BLD VENIPUNCTURE: CPT

## 2025-02-27 PROCEDURE — 99214 OFFICE O/P EST MOD 30 MIN: CPT | Performed by: FAMILY MEDICINE

## 2025-02-27 PROCEDURE — 85025 COMPLETE CBC W/AUTO DIFF WBC: CPT

## 2025-02-27 PROCEDURE — 80053 COMPREHEN METABOLIC PANEL: CPT

## 2025-02-27 PROCEDURE — 3080F DIAST BP >= 90 MM HG: CPT | Performed by: FAMILY MEDICINE

## 2025-02-27 PROCEDURE — 80061 LIPID PANEL: CPT

## 2025-02-27 PROCEDURE — 3077F SYST BP >= 140 MM HG: CPT | Performed by: FAMILY MEDICINE

## 2025-02-27 PROCEDURE — 84443 ASSAY THYROID STIM HORMONE: CPT

## 2025-02-27 PROCEDURE — 83036 HEMOGLOBIN GLYCOSYLATED A1C: CPT

## 2025-02-27 PROCEDURE — G2211 COMPLEX E/M VISIT ADD ON: HCPCS | Performed by: FAMILY MEDICINE

## 2025-02-27 RX ORDER — AMLODIPINE BESYLATE 10 MG/1
10 TABLET ORAL DAILY
Qty: 90 TABLET | Refills: 3 | Status: CANCELLED | OUTPATIENT
Start: 2025-02-27 | End: 2026-02-22

## 2025-02-27 RX ORDER — LOSARTAN POTASSIUM AND HYDROCHLOROTHIAZIDE 12.5; 1 MG/1; MG/1
1 TABLET ORAL DAILY
Qty: 90 TABLET | Refills: 0 | Status: SHIPPED | OUTPATIENT
Start: 2025-02-27 | End: 2025-05-28

## 2025-02-27 NOTE — PROGRESS NOTES
[facility]      Patient:  Mak Willingham 39 y.o. male     Date of Service: 02/27/25      Chief Complaint:   Chief Complaint   Patient presents with    Follow-up     Hyperlipidemia  hypertension         History of Present Illness     Concerns of chronic hypertension, hld    Patient with chronic history of hypertension, has been off his medications for an extended period of time.  Asymptomatic with no cp/sob.  Was previously maintained on Hyzaar, amlodipine.  Tolerated the medications well with no issues or side effects.    Allergies:    Ceclor [cefaclor]    Medication List:    Current Outpatient Medications   Medication Sig Dispense Refill    losartan-hydroCHLOROthiazide (HYZAAR) 100-12.5 MG per tablet Take 1 tablet by mouth daily 90 tablet 0    amLODIPine (NORVASC) 10 MG tablet Take 1 tablet by mouth daily 90 tablet 3     No current facility-administered medications for this visit.          Review of Systems   See hpi  Physical Exam   Physical Exam  Constitutional:       Appearance: Well-developed.   HENT:      Head: Normocephalic.      Right Ear: External ear normal.      Left Ear: External ear normal.   Cardiovascular:      Rate and Rhythm: Normal rate and regular rhythm.      Heart sounds: Normal heart sounds. No murmur heard.  Pulmonary:      Effort: Pulmonary effort is normal.      Breath sounds: Normal breath sounds. No wheezing  Musculoskeletal:         General: Normal range of motion.      Cervical back: Normal range of motion.   Skin:     General: Skin is warm.      Findings: No erythema.   Neurological:      Mental Status: Alert and oriented to person, place, and time.   Psychiatric:         Behavior: Behavior normal.     Vitals:    02/27/25 0907   BP: (!) 150/110   Pulse:    SpO2:          Assessment and Plan     Hypertension, hyperlipidemia, elevated lft    Hypertension  -Has been off medications for an extended period of time, recheck on blood pressures still remains elevated.

## 2025-03-14 ENCOUNTER — HOSPITAL ENCOUNTER (OUTPATIENT)
Age: 40
Discharge: HOME | End: 2025-03-16
Payer: COMMERCIAL

## 2025-03-14 ENCOUNTER — HOSPITAL ENCOUNTER (OUTPATIENT)
Dept: GENERAL RADIOLOGY | Age: 40
Discharge: HOME | End: 2025-03-16
Payer: COMMERCIAL

## 2025-03-14 DIAGNOSIS — M54.16 LUMBAR RADICULOPATHY: ICD-10-CM

## 2025-03-14 PROCEDURE — 72110 X-RAY EXAM L-2 SPINE 4/>VWS: CPT

## 2025-03-21 ENCOUNTER — HOSPITAL ENCOUNTER (OUTPATIENT)
Age: 40
Discharge: HOME | End: 2025-03-21
Payer: COMMERCIAL

## 2025-03-21 DIAGNOSIS — I10 ESSENTIAL HYPERTENSION: ICD-10-CM

## 2025-03-21 LAB
ANION GAP SERPL CALCULATED.3IONS-SCNC: 9 MMOL/L (ref 9–16)
BUN SERPL-MCNC: 13 MG/DL (ref 6–20)
BUN/CREAT SERPL: 13 (ref 9–20)
CALCIUM SERPL-MCNC: 9.8 MG/DL (ref 8.6–10.4)
CHLORIDE SERPL-SCNC: 100 MMOL/L (ref 98–107)
CO2 SERPL-SCNC: 29 MMOL/L (ref 20–31)
CREAT SERPL-MCNC: 1 MG/DL (ref 0.7–1.2)
GFR, ESTIMATED: >90 ML/MIN/1.73M2
GLUCOSE SERPL-MCNC: 97 MG/DL (ref 74–99)
POTASSIUM SERPL-SCNC: 4.5 MMOL/L (ref 3.7–5.3)
SODIUM SERPL-SCNC: 138 MMOL/L (ref 136–145)

## 2025-03-21 PROCEDURE — 36415 COLL VENOUS BLD VENIPUNCTURE: CPT

## 2025-03-21 PROCEDURE — 80048 BASIC METABOLIC PNL TOTAL CA: CPT

## 2025-03-24 ENCOUNTER — OFFICE VISIT (OUTPATIENT)
Dept: PRIMARY CARE CLINIC | Age: 40
End: 2025-03-24
Payer: COMMERCIAL

## 2025-03-24 VITALS
WEIGHT: 251.6 LBS | HEART RATE: 63 BPM | SYSTOLIC BLOOD PRESSURE: 128 MMHG | BODY MASS INDEX: 33.71 KG/M2 | DIASTOLIC BLOOD PRESSURE: 88 MMHG | OXYGEN SATURATION: 98 %

## 2025-03-24 DIAGNOSIS — I10 ESSENTIAL HYPERTENSION: Primary | ICD-10-CM

## 2025-03-24 DIAGNOSIS — E78.5 HYPERLIPIDEMIA, UNSPECIFIED HYPERLIPIDEMIA TYPE: ICD-10-CM

## 2025-03-24 PROCEDURE — 99214 OFFICE O/P EST MOD 30 MIN: CPT | Performed by: FAMILY MEDICINE

## 2025-03-24 PROCEDURE — 3079F DIAST BP 80-89 MM HG: CPT | Performed by: FAMILY MEDICINE

## 2025-03-24 PROCEDURE — G2211 COMPLEX E/M VISIT ADD ON: HCPCS | Performed by: FAMILY MEDICINE

## 2025-03-24 PROCEDURE — 3074F SYST BP LT 130 MM HG: CPT | Performed by: FAMILY MEDICINE

## 2025-03-24 ASSESSMENT — PATIENT HEALTH QUESTIONNAIRE - PHQ9
SUM OF ALL RESPONSES TO PHQ QUESTIONS 1-9: 0
1. LITTLE INTEREST OR PLEASURE IN DOING THINGS: NOT AT ALL
SUM OF ALL RESPONSES TO PHQ QUESTIONS 1-9: 0
2. FEELING DOWN, DEPRESSED OR HOPELESS: NOT AT ALL
SUM OF ALL RESPONSES TO PHQ QUESTIONS 1-9: 0
SUM OF ALL RESPONSES TO PHQ QUESTIONS 1-9: 0

## 2025-05-27 DIAGNOSIS — I10 ESSENTIAL HYPERTENSION: ICD-10-CM

## 2025-05-27 RX ORDER — LOSARTAN POTASSIUM AND HYDROCHLOROTHIAZIDE 12.5; 1 MG/1; MG/1
1 TABLET ORAL DAILY
Qty: 90 TABLET | Refills: 0 | Status: SHIPPED | OUTPATIENT
Start: 2025-05-27 | End: 2025-08-25

## 2025-06-16 ENCOUNTER — OFFICE VISIT (OUTPATIENT)
Dept: PRIMARY CARE CLINIC | Age: 40
End: 2025-06-16
Payer: COMMERCIAL

## 2025-06-16 VITALS
SYSTOLIC BLOOD PRESSURE: 136 MMHG | WEIGHT: 260.8 LBS | DIASTOLIC BLOOD PRESSURE: 94 MMHG | BODY MASS INDEX: 34.94 KG/M2 | HEART RATE: 78 BPM | OXYGEN SATURATION: 96 %

## 2025-06-16 DIAGNOSIS — E78.2 MIXED HYPERLIPIDEMIA: ICD-10-CM

## 2025-06-16 DIAGNOSIS — I10 ESSENTIAL HYPERTENSION: Primary | ICD-10-CM

## 2025-06-16 PROCEDURE — G2211 COMPLEX E/M VISIT ADD ON: HCPCS | Performed by: FAMILY MEDICINE

## 2025-06-16 PROCEDURE — 3080F DIAST BP >= 90 MM HG: CPT | Performed by: FAMILY MEDICINE

## 2025-06-16 PROCEDURE — 99214 OFFICE O/P EST MOD 30 MIN: CPT | Performed by: FAMILY MEDICINE

## 2025-06-16 PROCEDURE — 3075F SYST BP GE 130 - 139MM HG: CPT | Performed by: FAMILY MEDICINE

## 2025-06-16 RX ORDER — CELECOXIB 200 MG/1
200 CAPSULE ORAL DAILY
COMMUNITY
Start: 2025-05-14 | End: 2025-06-16

## 2025-06-16 RX ORDER — CYCLOBENZAPRINE HCL 10 MG
5 TABLET ORAL NIGHTLY PRN
COMMUNITY
Start: 2025-05-16

## 2025-06-16 SDOH — ECONOMIC STABILITY: FOOD INSECURITY: WITHIN THE PAST 12 MONTHS, YOU WORRIED THAT YOUR FOOD WOULD RUN OUT BEFORE YOU GOT MONEY TO BUY MORE.: NEVER TRUE

## 2025-06-16 SDOH — ECONOMIC STABILITY: FOOD INSECURITY: WITHIN THE PAST 12 MONTHS, THE FOOD YOU BOUGHT JUST DIDN'T LAST AND YOU DIDN'T HAVE MONEY TO GET MORE.: NEVER TRUE

## 2025-06-16 ASSESSMENT — PATIENT HEALTH QUESTIONNAIRE - PHQ9
2. FEELING DOWN, DEPRESSED OR HOPELESS: NOT AT ALL
SUM OF ALL RESPONSES TO PHQ QUESTIONS 1-9: 0
SUM OF ALL RESPONSES TO PHQ QUESTIONS 1-9: 0
1. LITTLE INTEREST OR PLEASURE IN DOING THINGS: NOT AT ALL
SUM OF ALL RESPONSES TO PHQ QUESTIONS 1-9: 0
SUM OF ALL RESPONSES TO PHQ QUESTIONS 1-9: 0

## 2025-06-16 NOTE — PROGRESS NOTES
[facility]      Patient:  Mak Willingham 39 y.o. male     Date of Service: 06/16/25      Chief Complaint:   Chief Complaint   Patient presents with    Hypertension     With back injury, he hasn't been able to exercise, needs to clean up diet better he stated  No new concerns         History of Present Illness     Concerns of chronic hypertension, hld    Patient with chronic history of hypertension and hyperlipidemia.  Continues on Hyzaar, Norvasc.  Tolerating medications well without side effects or intolerances.   BP controlled and otherwise asymptomatic with no CP/SOB    Allergies:    Ceclor [cefaclor]    Medication List:    Current Outpatient Medications   Medication Sig Dispense Refill    cyclobenzaprine (FLEXERIL) 10 MG tablet Take 0.5 tablets by mouth nightly as needed for Muscle spasms      losartan-hydroCHLOROthiazide (HYZAAR) 100-12.5 MG per tablet Take 1 tablet by mouth daily 90 tablet 0     No current facility-administered medications for this visit.          Review of Systems   See hpi  Physical Exam   Physical Exam  Constitutional:       Appearance: Well-developed.   HENT:      Head: Normocephalic.      Right Ear: External ear normal.      Left Ear: External ear normal.   Cardiovascular:      Rate and Rhythm: Normal rate and regular rhythm.      Heart sounds: Normal heart sounds. No murmur heard.  Pulmonary:      Effort: Pulmonary effort is normal.      Breath sounds: Normal breath sounds. No wheezing  Musculoskeletal:         General: Normal range of motion.      Cervical back: Normal range of motion.   Skin:     General: Skin is warm.      Findings: No erythema.   Neurological:      Mental Status: Alert and oriented to person, place, and time.   Psychiatric:         Behavior: Behavior normal.     Vitals:    06/16/25 0844   BP: (!) 138/92   Pulse: 78   SpO2: 96%         Assessment and Plan     Hypertension, hyperlipidemia    Hypertension  BP is controlled at this time, continue

## 2025-08-27 DIAGNOSIS — I10 ESSENTIAL HYPERTENSION: ICD-10-CM

## 2025-08-27 RX ORDER — LOSARTAN POTASSIUM AND HYDROCHLOROTHIAZIDE 12.5; 1 MG/1; MG/1
1 TABLET ORAL DAILY
Qty: 90 TABLET | Refills: 1 | Status: SHIPPED | OUTPATIENT
Start: 2025-08-27 | End: 2026-02-23